# Patient Record
Sex: MALE | Race: BLACK OR AFRICAN AMERICAN | NOT HISPANIC OR LATINO | Employment: UNEMPLOYED | URBAN - METROPOLITAN AREA
[De-identification: names, ages, dates, MRNs, and addresses within clinical notes are randomized per-mention and may not be internally consistent; named-entity substitution may affect disease eponyms.]

---

## 2020-11-16 ENCOUNTER — TELEPHONE (OUTPATIENT)
Dept: ORTHOPEDICS | Facility: CLINIC | Age: 25
End: 2020-11-16

## 2020-11-16 NOTE — TELEPHONE ENCOUNTER
Spoke with pt. Advised pt Dr Jackson sends all foot fx to Dr Thompson the Podiatrist. Advised pt I would send her nurse a message and they will be in touch to schedule. All questions answered. Pt verbalized understanding.

## 2020-11-16 NOTE — TELEPHONE ENCOUNTER
----- Message from Elena Lawrence sent at 11/16/2020 11:14 AM CST -----  Regarding: Pt  Reason; Pt calling to schedule appt for today if possible for broken foot. Please call     Communication: [phone]

## 2020-11-19 ENCOUNTER — OFFICE VISIT (OUTPATIENT)
Dept: PODIATRY | Facility: CLINIC | Age: 25
End: 2020-11-19
Payer: MEDICAID

## 2020-11-19 VITALS
HEIGHT: 71 IN | WEIGHT: 315 LBS | HEART RATE: 78 BPM | BODY MASS INDEX: 44.1 KG/M2 | DIASTOLIC BLOOD PRESSURE: 82 MMHG | SYSTOLIC BLOOD PRESSURE: 180 MMHG

## 2020-11-19 DIAGNOSIS — S92.301A CLOSED AVULSION FRACTURE OF METATARSAL BONE OF RIGHT FOOT, INITIAL ENCOUNTER: ICD-10-CM
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
## 2020-11-19 DIAGNOSIS — S92.351A CLOSED DISPLACED FRACTURE OF FIFTH METATARSAL BONE OF RIGHT FOOT, INITIAL ENCOUNTER: ICD-10-CM

## 2020-11-19 PROCEDURE — 99202 PR OFFICE/OUTPT VISIT, NEW, LEVL II, 15-29 MIN: ICD-10-PCS | Mod: 25,S$PBB,, | Performed by: PODIATRIST

## 2020-11-19 PROCEDURE — 28470 PR CLOSED RX METATARSAL FX: ICD-10-PCS | Mod: S$PBB,RT,, | Performed by: PODIATRIST

## 2020-11-19 PROCEDURE — 99213 OFFICE O/P EST LOW 20 MIN: CPT | Mod: PBBFAC,PN,25 | Performed by: PODIATRIST

## 2020-11-19 PROCEDURE — 99202 OFFICE O/P NEW SF 15 MIN: CPT | Mod: 25,S$PBB,, | Performed by: PODIATRIST

## 2020-11-19 PROCEDURE — 99999 PR PBB SHADOW E&M-EST. PATIENT-LVL III: ICD-10-PCS | Mod: PBBFAC,,, | Performed by: PODIATRIST

## 2020-11-19 PROCEDURE — 28470 CLTX METATARSAL FX WO MNP EA: CPT | Mod: PBBFAC,PN | Performed by: PODIATRIST

## 2020-11-19 PROCEDURE — 99999 PR PBB SHADOW E&M-EST. PATIENT-LVL III: CPT | Mod: PBBFAC,,, | Performed by: PODIATRIST

## 2020-11-19 PROCEDURE — 28470 CLTX METATARSAL FX WO MNP EA: CPT | Mod: S$PBB,RT,, | Performed by: PODIATRIST

## 2020-11-19 NOTE — PROGRESS NOTES
Subjective:      Patient ID: Reji Moulton is a 25 y.o. male.    Chief Complaint: Foot Injury (broken right foot )    Reji is a 25 y.o. male who presents to the podiatry clinic  with complaint of  right foot pain. Onset of the symptoms was several days ago. Precipitating event: injured stepping into a hole off [place] in slippers.  Occurred last Fri.& went to ED Sat. Current symptoms include: inability to bear weight, pain with inversion of the foot and swelling. Aggravating factors: walking. Symptoms have waxed and waned. Patient has had no prior foot problems. Evaluation to date: plain films: abnormal fracture and displaced. Treatment to date: avoidance of offending activity and splint & crutches. Patients rates pain 8/10 on pain scale.     Objective:      Review of Systems   Constitution: Negative for malaise/fatigue.   Skin: Negative for suspicious lesions.   Musculoskeletal: Positive for falls. Negative for muscle cramps and muscle weakness.   Neurological: Negative for paresthesias and weakness.   Psychiatric/Behavioral: The patient is not nervous/anxious.        Physical Exam  Vitals signs reviewed.   Constitutional:       General: He is not in acute distress.     Appearance: He is well-developed. He is morbidly obese.   Cardiovascular:      Pulses: Normal pulses.           Dorsalis pedis pulses are 2+ on the right side.   Musculoskeletal: Normal range of motion.         General: Swelling, tenderness and signs of injury present. No deformity.      Right foot: Normal range of motion. No deformity.        Feet:    Feet:      Right foot:      Skin integrity: Skin integrity normal. No erythema, warmth or callus.   Skin:     General: Skin is warm.      Capillary Refill: Capillary refill takes less than 2 seconds.      Findings: No bruising, erythema, lesion or wound.      Nails: There is no clubbing.     Neurological:      Mental Status: He is alert and oriented to person, place, and time.      Sensory:  Sensation is intact. No sensory deficit.      Motor: Motor function is intact. No weakness.      Gait: Gait abnormal.   Psychiatric:         Mood and Affect: Mood and affect normal. Mood is not anxious.         Behavior: Behavior normal. Behavior is not agitated. Behavior is cooperative.          Assessment:      Encounter Diagnoses   Name Primary?    Closed avulsion fracture of metatarsal bone of right foot, initial encounter     Slipping, tripping and stumbling without falling due to stepping into hole or opening, initial encounter Yes    Closed displaced fracture of fifth metatarsal bone of right foot, initial encounter          Plan:           Reji was seen today for foot injury.    Diagnoses and all orders for this visit:    Slipping, tripping and stumbling without falling due to stepping into hole or opening, initial encounter    Closed avulsion fracture of metatarsal bone of right foot, initial encounter  -     X-Ray Foot Complete Right; Future  -     NON-PNEUMATIC WALKING BOOT FOR HOME USE    Closed displaced fracture of fifth metatarsal bone of right foot, initial encounter  -     X-Ray Foot Complete Right; Future  -     NON-PNEUMATIC WALKING BOOT FOR HOME USE    I counseled the patient on his conditions, their implications and medical management.    F/U Xrays 4-6weeks.
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
## 2020-11-23 PROBLEM — S92.351A CLOSED DISPLACED FRACTURE OF FIFTH METATARSAL BONE OF RIGHT FOOT: Status: ACTIVE | Noted: 2020-11-23

## 2020-11-23 PROBLEM — S92.301A CLOSED AVULSION FRACTURE OF METATARSAL BONE OF RIGHT FOOT: Status: ACTIVE | Noted: 2020-11-23

## 2020-11-24 NOTE — ASSESSMENT & PLAN NOTE
5th met.base fracture d/t peroneus brevis pull right foot.    Immobilization in short BAW up to 3 months. Re-evaluate w/ serial Xrays.